# Patient Record
Sex: MALE
[De-identification: names, ages, dates, MRNs, and addresses within clinical notes are randomized per-mention and may not be internally consistent; named-entity substitution may affect disease eponyms.]

---
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
## 2024-02-16 ENCOUNTER — APPOINTMENT (OUTPATIENT)
Dept: DERMATOLOGY | Facility: CLINIC | Age: 19
End: 2024-02-16
Payer: COMMERCIAL

## 2024-02-16 DIAGNOSIS — B36.0 PITYRIASIS VERSICOLOR: ICD-10-CM

## 2024-02-16 DIAGNOSIS — D22.9 MELANOCYTIC NEVI, UNSPECIFIED: ICD-10-CM

## 2024-02-16 DIAGNOSIS — Z12.83 ENCOUNTER FOR SCREENING FOR MALIGNANT NEOPLASM OF SKIN: ICD-10-CM

## 2024-02-16 PROCEDURE — 99214 OFFICE O/P EST MOD 30 MIN: CPT

## 2024-02-17 PROBLEM — D22.9 ATYPICAL MOLE: Status: ACTIVE | Noted: 2024-02-17

## 2024-02-17 PROBLEM — Z12.83 SKIN CANCER SCREENING: Status: ACTIVE | Noted: 2021-02-27

## 2024-02-17 PROBLEM — B36.0 TINEA VERSICOLOR: Status: ACTIVE | Noted: 2024-02-17

## 2024-02-17 PROBLEM — D22.9 MULTIPLE BENIGN MELANOCYTIC NEVI: Status: ACTIVE | Noted: 2024-02-17

## 2024-02-17 RX ORDER — KETOCONAZOLE 20.5 MG/ML
2 SHAMPOO, SUSPENSION TOPICAL
Qty: 1 | Refills: 3 | Status: ACTIVE | COMMUNITY
Start: 2024-02-17 | End: 1900-01-01

## 2024-02-17 NOTE — PHYSICAL EXAM
[FreeTextEntry3] :  AAOx3, NAD, well-appearing / pleasant Total body skin exam performed within normal limits with the exception of:  - examination of genitalia deferred   Irregularly shaped brown thin papule left forehead, though with uniform pigment network on dermoscopy Fairly uniform and regular brown macules and papules on the trunk and extremities Tan patches with inducible scale over back

## 2024-02-17 NOTE — ASSESSMENT
[FreeTextEntry1] : #TV- chronic, flaring - Start keto 2% wash to body when showering, best if left on for few min prior to rinsing. SED  #Atypical mole left forehead - Likely with irregular shape as patient tried to remove on his own with a plasma pen - Reassuring pigment network under dermoscopy  - Photos taken, will re-check in 3 months to ensure no change (donald as patient states it was growing prior to him trying to remove it)  # Multiple melanocytic nevi, all benign appearing -none c/f malignancy -Sun protection was discussed. The proper use of broad spectrum UVB/UVA sunscreen with SPF 30 or greater was reviewed and the need for re-application after swimming or sweating or 2-3 hours was emphasized. We discussed judicious use of clothing and avoidance of peak periods of sun exposure. I made the patient aware of need for year-round protection. ABCDEs of melanoma reviewed.  -Self-skin check also reviewed -Counseled pt to monitor for changes  # Screening for skin cancer -ABCDEs of melanoma, sun safety, and self-skin check reviewed -Counseled pt to notify us of any changing or concerning lesions -RTC 12 months, sooner prn

## 2024-02-17 NOTE — HISTORY OF PRESENT ILLNESS
[FreeTextEntry1] : RP moles, rash [de-identified] : EVENS BLOCK is a 18 year old M who presents for evaluation of the following  1. Moles on body he would like evaluated, requesting FBSE Has self-removed some at home with plasma pen few years prior, including one partially treated on his left forehead he is bothered by the appearance of. Thinks it was growing before he attempted removal  2. Rash over back Comes and goes Previously responded to a wash

## 2024-07-12 ENCOUNTER — APPOINTMENT (OUTPATIENT)
Dept: DERMATOLOGY | Facility: CLINIC | Age: 19
End: 2024-07-12
Payer: COMMERCIAL

## 2024-07-12 DIAGNOSIS — D22.9 MELANOCYTIC NEVI, UNSPECIFIED: ICD-10-CM

## 2024-07-12 PROCEDURE — 99213 OFFICE O/P EST LOW 20 MIN: CPT

## 2025-05-02 ENCOUNTER — APPOINTMENT (OUTPATIENT)
Dept: DERMATOLOGY | Facility: CLINIC | Age: 20
End: 2025-05-02

## 2025-06-27 ENCOUNTER — APPOINTMENT (OUTPATIENT)
Dept: DERMATOLOGY | Facility: CLINIC | Age: 20
End: 2025-06-27
Payer: COMMERCIAL

## 2025-06-27 PROCEDURE — 99214 OFFICE O/P EST MOD 30 MIN: CPT
